# Patient Record
Sex: FEMALE | Race: OTHER | HISPANIC OR LATINO
[De-identification: names, ages, dates, MRNs, and addresses within clinical notes are randomized per-mention and may not be internally consistent; named-entity substitution may affect disease eponyms.]

---

## 2020-12-01 ENCOUNTER — APPOINTMENT (OUTPATIENT)
Dept: OTOLARYNGOLOGY | Facility: CLINIC | Age: 50
End: 2020-12-01
Payer: COMMERCIAL

## 2020-12-01 VITALS
BODY MASS INDEX: 27.32 KG/M2 | WEIGHT: 170 LBS | HEART RATE: 77 BPM | DIASTOLIC BLOOD PRESSURE: 89 MMHG | HEIGHT: 66 IN | SYSTOLIC BLOOD PRESSURE: 133 MMHG

## 2020-12-01 DIAGNOSIS — R53.83 OTHER FATIGUE: ICD-10-CM

## 2020-12-01 DIAGNOSIS — H92.09 OTALGIA, UNSPECIFIED EAR: ICD-10-CM

## 2020-12-01 DIAGNOSIS — Z82.2 FAMILY HISTORY OF DEAFNESS AND HEARING LOSS: ICD-10-CM

## 2020-12-01 DIAGNOSIS — R51.9 HEADACHE, UNSPECIFIED: ICD-10-CM

## 2020-12-01 DIAGNOSIS — Z83.52 FAMILY HISTORY OF EAR DISORDERS: ICD-10-CM

## 2020-12-01 DIAGNOSIS — R09.81 NASAL CONGESTION: ICD-10-CM

## 2020-12-01 PROBLEM — Z00.00 ENCOUNTER FOR PREVENTIVE HEALTH EXAMINATION: Status: ACTIVE | Noted: 2020-12-01

## 2020-12-01 PROCEDURE — 99072 ADDL SUPL MATRL&STAF TM PHE: CPT

## 2020-12-01 PROCEDURE — 92557 COMPREHENSIVE HEARING TEST: CPT

## 2020-12-01 PROCEDURE — 99205 OFFICE O/P NEW HI 60 MIN: CPT | Mod: 25

## 2020-12-01 PROCEDURE — 92567 TYMPANOMETRY: CPT

## 2020-12-01 RX ORDER — TRIAMCINOLONE ACETONIDE 1 MG/G
0.1 PASTE DENTAL
Qty: 5 | Refills: 0 | Status: DISCONTINUED | COMMUNITY
Start: 2020-11-16

## 2020-12-01 RX ORDER — MECLIZINE HYDROCHLORIDE 12.5 MG/1
12.5 TABLET ORAL
Qty: 30 | Refills: 0 | Status: DISCONTINUED | COMMUNITY
Start: 2020-08-22

## 2020-12-01 RX ORDER — MULTIVITAMIN WITH FOLIC ACID 400 MCG
TABLET ORAL
Qty: 30 | Refills: 0 | Status: ACTIVE | COMMUNITY
Start: 2020-10-23

## 2020-12-01 RX ORDER — HYDROXYZINE PAMOATE 25 MG/1
25 CAPSULE ORAL
Qty: 30 | Refills: 0 | Status: DISCONTINUED | COMMUNITY
Start: 2020-07-24

## 2020-12-01 RX ORDER — CYCLOBENZAPRINE HYDROCHLORIDE 10 MG/1
10 TABLET, FILM COATED ORAL
Qty: 30 | Refills: 0 | Status: ACTIVE | COMMUNITY
Start: 2020-07-24

## 2020-12-01 RX ORDER — ALENDRONATE SODIUM 70 MG/1
70 TABLET ORAL
Qty: 4 | Refills: 0 | Status: DISCONTINUED | COMMUNITY
Start: 2020-07-24

## 2020-12-01 RX ORDER — LORATADINE 10 MG/1
10 TABLET ORAL
Qty: 30 | Refills: 0 | Status: DISCONTINUED | COMMUNITY
Start: 2020-07-24

## 2020-12-01 RX ORDER — CHLORHEXIDINE GLUCONATE, 0.12% ORAL RINSE 1.2 MG/ML
0.12 SOLUTION DENTAL
Qty: 473 | Refills: 0 | Status: DISCONTINUED | COMMUNITY
Start: 2020-11-16

## 2020-12-01 RX ORDER — CALCIUM/D3/MAG OX/COP/MANG/ZN 600 MG-20
600-800 TABLET ORAL
Qty: 60 | Refills: 0 | Status: ACTIVE | COMMUNITY
Start: 2020-11-13

## 2020-12-01 NOTE — PHYSICAL EXAM
[de-identified] : No bruits! [Midline] : trachea located in midline position [] : Frank[place]-Hallpike test is negative [Normal] : no rashes

## 2020-12-01 NOTE — REASON FOR VISIT
[Initial Evaluation] : an initial evaluation for [Initial Consultation] : an initial consultation for [FreeTextEntry2] : initial visit for otalgia

## 2020-12-01 NOTE — PHYSICAL EXAM
[de-identified] : No bruits! [Midline] : trachea located in midline position [] : Beemer-Hallpike test is negative [Normal] : no rashes

## 2020-12-01 NOTE — CONSULT LETTER
[Dear  ___] : Dear  [unfilled], [Consult Letter:] : I had the pleasure of evaluating your patient, [unfilled]. [Please see my note below.] : Please see my note below. [Sincerely,] : Sincerely, [Consult Closing:] : Thank you very much for allowing me to participate in the care of this patient.  If you have any questions, please do not hesitate to contact me. [FreeTextEntry2] : Kali Strickland MD [FreeTextEntry3] : Rush Garrett MD, FACS\par Chief of Otolaryngology at [facility]\par \par Dept. of Otolaryngology\par [facility]\par

## 2020-12-01 NOTE — ASSESSMENT
[FreeTextEntry1] : Pt with no evidence of BPPV.  Her history is suspicious for vestibular migraine, or possibly Meniere's.  Pt to see on of the Otology team members for further management.

## 2020-12-01 NOTE — CONSULT LETTER
[Dear  ___] : Dear  [unfilled], [Consult Letter:] : I had the pleasure of evaluating your patient, [unfilled]. [Please see my note below.] : Please see my note below. [Sincerely,] : Sincerely, [Consult Closing:] : Thank you very much for allowing me to participate in the care of this patient.  If you have any questions, please do not hesitate to contact me. [FreeTextEntry2] : Kali Strickland MD [FreeTextEntry3] : Rush Garrett MD, FACS\par Chief of Otolaryngology at [facility]\par \par Dept. of Otolaryngology\par [facility]\par

## 2021-01-07 ENCOUNTER — APPOINTMENT (OUTPATIENT)
Dept: OTOLARYNGOLOGY | Facility: CLINIC | Age: 51
End: 2021-01-07
Payer: COMMERCIAL

## 2021-01-07 VITALS
WEIGHT: 170 LBS | DIASTOLIC BLOOD PRESSURE: 87 MMHG | HEIGHT: 66 IN | HEART RATE: 76 BPM | BODY MASS INDEX: 27.32 KG/M2 | SYSTOLIC BLOOD PRESSURE: 149 MMHG

## 2021-01-07 DIAGNOSIS — R11.10 VOMITING, UNSPECIFIED: ICD-10-CM

## 2021-01-07 DIAGNOSIS — H93.13 TINNITUS, BILATERAL: ICD-10-CM

## 2021-01-07 PROCEDURE — 99214 OFFICE O/P EST MOD 30 MIN: CPT

## 2021-01-07 PROCEDURE — 99072 ADDL SUPL MATRL&STAF TM PHE: CPT

## 2021-01-07 RX ORDER — ALPRAZOLAM 0.5 MG/1
0.5 TABLET, ORALLY DISINTEGRATING ORAL
Qty: 20 | Refills: 0 | Status: ACTIVE | COMMUNITY
Start: 2021-01-07 | End: 1900-01-01

## 2021-01-07 RX ORDER — ONDANSETRON 4 MG/1
4 TABLET, ORALLY DISINTEGRATING ORAL
Qty: 30 | Refills: 1 | Status: ACTIVE | COMMUNITY
Start: 2021-01-07 | End: 1900-01-01

## 2021-01-07 RX ORDER — SCOPOLAMINE 1.5 MG/1
1 PATCH, EXTENDED RELEASE TRANSDERMAL
Qty: 10 | Refills: 0 | Status: ACTIVE | COMMUNITY
Start: 2021-01-07 | End: 1900-01-01

## 2021-01-07 NOTE — HISTORY OF PRESENT ILLNESS
[de-identified] : 50 year old referred by Dr. Garrett fainting, vomiting.  States in the past 6 months has fainted 3-4 times, has tinnitus, nausea and vomiting.  States has a lot of fatigue.  Lasts about a day when she has episodes.  States has had episodes for the past 10 years, and becoming more frequent.  Used to occur when she was traveling by plane but now is occurring even without traveling.  \par \par Brief episodes of room spinning vertigo with vomiting.  Vertigo last 2-3 minutes, vomits, then feels fatigues for 2-3 days\par This happens about once a week, has been occurring for the last year, more with movement\par hears heartbeat in both ears with episodes/headache\par used to only happen when she traveled/fly \par + Headaches, Migraines >10 years

## 2021-01-07 NOTE — DATA REVIEWED
[de-identified] : I personally reviewed the patient's audiogram, which shows\par hearing wnl AU\par type Ad tymp AU

## 2021-01-07 NOTE — REASON FOR VISIT
[Subsequent Evaluation] : a subsequent evaluation for [FreeTextEntry2] : referred by Dr. Garrett for fainting, vomiting

## 2021-01-07 NOTE — PHYSICAL EXAM
[Normal] : no rashes [Hearing Loss Right Only] : normal [Hearing Loss Left Only] : normal [Nystagmus] : ~T no ~M nystagmus was seen [Fukuda Step Test] : Fukuda Step Test was Negative [Romberg's Sign] : Romberg's sign was absent [Fistula Sign] : Fistula Sign: Negative [Past-Pointing] : Past-Pointing: Negative [Brittany-Hallanake] : [place]ram-Hallpike: Negative

## 2021-04-06 ENCOUNTER — APPOINTMENT (OUTPATIENT)
Dept: OTOLARYNGOLOGY | Facility: CLINIC | Age: 51
End: 2021-04-06
Payer: COMMERCIAL

## 2021-04-06 DIAGNOSIS — G43.909 MIGRAINE, UNSPECIFIED, NOT INTRACTABLE, W/OUT STATUS MIGRAINOSUS: ICD-10-CM

## 2021-04-06 DIAGNOSIS — R42 DIZZINESS AND GIDDINESS: ICD-10-CM

## 2021-04-06 PROCEDURE — 99072 ADDL SUPL MATRL&STAF TM PHE: CPT

## 2021-04-06 PROCEDURE — 99213 OFFICE O/P EST LOW 20 MIN: CPT

## 2021-04-07 PROBLEM — G43.909 MIGRAINE HEADACHE: Status: ACTIVE | Noted: 2020-12-01

## 2021-04-07 PROBLEM — R42 VERTIGO: Status: ACTIVE | Noted: 2020-12-01

## 2021-04-07 NOTE — HISTORY OF PRESENT ILLNESS
Pt medically cleared per MD Robledo. ADT-22 placed.   [de-identified] : 49 y/o woman presents today for VM and vertigo.  Vertigo better, Migraines persist\par has been watching diet\par keeping a diary of food triggers\par had identified troublesome foods\par had only one episode of bad vertigo and vomiting\par walking much better\par took Xanax about three times\par taking zofran as needed\par still with Migraines about three times a week, not responsive to NSAID\par reports high stress levels\par

## 2021-04-07 NOTE — REASON FOR VISIT
[Subsequent Evaluation] : a subsequent evaluation for [FreeTextEntry2] : f/u vertigo and vestibular migraine

## 2021-04-22 ENCOUNTER — APPOINTMENT (OUTPATIENT)
Dept: NEUROLOGY | Facility: CLINIC | Age: 51
End: 2021-04-22
Payer: COMMERCIAL

## 2021-04-22 VITALS
WEIGHT: 160 LBS | DIASTOLIC BLOOD PRESSURE: 80 MMHG | BODY MASS INDEX: 25.71 KG/M2 | HEART RATE: 82 BPM | SYSTOLIC BLOOD PRESSURE: 128 MMHG | OXYGEN SATURATION: 98 % | TEMPERATURE: 97 F | HEIGHT: 66 IN

## 2021-04-22 DIAGNOSIS — G43.809 OTHER MIGRAINE, NOT INTRACTABLE, W/OUT STATUS MIGRAINOSUS: ICD-10-CM

## 2021-04-22 PROCEDURE — 99072 ADDL SUPL MATRL&STAF TM PHE: CPT

## 2021-04-22 PROCEDURE — 99205 OFFICE O/P NEW HI 60 MIN: CPT

## 2021-04-22 RX ORDER — NAPROXEN 500 MG/1
500 TABLET ORAL
Qty: 60 | Refills: 2 | Status: ACTIVE | COMMUNITY
Start: 2020-10-23 | End: 1900-01-01

## 2021-04-22 RX ORDER — OMEGA-3/DHA/EPA/FISH OIL 300-1000MG
400 CAPSULE ORAL TWICE DAILY
Qty: 60 | Refills: 2 | Status: ACTIVE | COMMUNITY
Start: 2021-04-22 | End: 1900-01-01

## 2021-04-22 RX ORDER — PROPRANOLOL HYDROCHLORIDE 60 MG/1
60 CAPSULE, EXTENDED RELEASE ORAL DAILY
Qty: 30 | Refills: 0 | Status: ACTIVE | COMMUNITY
Start: 2021-04-22 | End: 1900-01-01

## 2021-04-22 RX ORDER — VITS A,C,E/LUTEIN/MINERALS 300MCG-200
TABLET ORAL
Refills: 0 | Status: ACTIVE | COMMUNITY

## 2021-04-23 NOTE — REASON FOR VISIT
[Consultation] : a consultation visit [[employer] ] : provided by [employer]   [FreeTextEntry1] : Headache, Vertigo

## 2021-04-23 NOTE — ASSESSMENT
[FreeTextEntry1] : 49yo female with history of migraine associated with vertigo presented for recurrent headaches worsening over the past year. Has been evaluated by ENT as well with no other obvious otologic source for presentation. Suspect vestibular migraine for which recommend to start propranolol once a day, at night time with use of Naprosyn only in case of a break through headache. Recommend to continue magnesium 400mg twice a day.

## 2021-04-23 NOTE — REVIEW OF SYSTEMS
[Feeling Tired] : feeling tired [Neck Pain] : neck pain [Sleep Disturbances] : sleep disturbances [Anxiety] : anxiety [Negative] : Heme/Lymph [As Noted in HPI] : as noted in HPI [FreeTextEntry8] : history of "kidney pain"

## 2021-04-23 NOTE — END OF VISIT
[FreeTextEntry3] : I have seen and examined the patient, and I agree with the above history, physical examination, assessment, and plan. [Time Spent: ___ minutes] : I have spent [unfilled] minutes of time on the encounter. [>50% of the face to face encounter time was spent on counseling and/or coordination of care for ___] : Greater than 50% of the face to face encounter time was spent on counseling and/or coordination of care for [unfilled]

## 2021-04-23 NOTE — HISTORY OF PRESENT ILLNESS
[FreeTextEntry1] : 51 yo female with history of migraine, osteoporosis, referred to office for frequent headaches. Patient reports she was diagnosed with migraine at age 11 and has tried multiple medications under previous neurologists, however has not had a complete control of her episodes; she cannot recollect the medications she has used before except for Naprosyn. Reports frequent headaches since over a year, frontal with radiation to the back of head and neck, pulsating and severe, exacerbated by stress or physical exertion. Episodes have been preceded by sensation of warmth, sometimes with metallic taste in mouth or tinnitus. Mostly associated with dizziness, which has been described as room spinning sensation, nausea and occasional vomiting. Reports minimal relief with naproxen in combination with cyclobenzaprine. Also responds to Magnesium 400mg twice a day.  Last episode of headache associated with vertigo was 2 months prior, currently endorses 2 episodes of headache per week. \par Of note, patient has seen ENT specialist as outpatient, has had negative work up for otologic source. I am assisted in this examination by resident physician, Dr. Lore Hernandez.

## 2021-04-23 NOTE — PHYSICAL EXAM
[General Appearance - Alert] : alert [General Appearance - In No Acute Distress] : in no acute distress [General Appearance - Well Nourished] : well nourished [General Appearance - Well Developed] : well developed [Oriented To Time, Place, And Person] : oriented to person, place, and time [Impaired Insight] : insight and judgment were intact [Affect] : the affect was normal [Mood] : the mood was normal [Memory Recent] : recent memory was not impaired [Memory Remote] : remote memory was not impaired [Person] : oriented to person [Place] : oriented to place [Time] : oriented to time [Short Term Intact] : short term memory intact [Remote Intact] : remote memory intact [Registration Intact] : recent registration memory intact [Span Intact] : the attention span was normal [Concentration Intact] : normal concentrating ability [Visual Intact] : visual attention was ~T not ~L decreased [Naming Objects] : no difficulty naming common objects [Repeating Phrases] : no difficulty repeating a phrase [Fluency] : fluency intact [Comprehension] : comprehension intact [Cranial Nerves Optic (II)] : visual acuity intact bilaterally,  visual fields full to confrontation, pupils equal round and reactive to light [Cranial Nerves Oculomotor (III)] : extraocular motion intact [Cranial Nerves Trigeminal (V)] : facial sensation intact symmetrically [Cranial Nerves Facial (VII)] : face symmetrical [Cranial Nerves Vestibulocochlear (VIII)] : hearing was intact bilaterally [Cranial Nerves Glossopharyngeal (IX)] : tongue and palate midline [Cranial Nerves Accessory (XI - Cranial And Spinal)] : head turning and shoulder shrug symmetric [Cranial Nerves Hypoglossal (XII)] : there was no tongue deviation with protrusion [Motor Strength] : muscle strength was normal in all four extremities [No Muscle Atrophy] : normal bulk in all four extremities [Sensation Tactile Decrease] : light touch was intact [Balance] : balance was intact [2+] : Ankle jerk left 2+ [Sclera] : the sclera and conjunctiva were normal [PERRL With Normal Accommodation] : pupils were equal in size, round, reactive to light, with normal accommodation [Extraocular Movements] : extraocular movements were intact [Outer Ear] : the ears and nose were normal in appearance [Oropharynx] : the oropharynx was normal [Neck Appearance] : the appearance of the neck was normal [Auscultation Breath Sounds / Voice Sounds] : lungs were clear to auscultation bilaterally [Heart Rate And Rhythm] : heart rate was normal and rhythm regular [Heart Sounds] : normal S1 and S2 [Heart Sounds Gallop] : no gallops [Murmurs] : no murmurs [Heart Sounds Pericardial Friction Rub] : no pericardial rub [Full Pulse] : the pedal pulses are present [Edema] : there was no peripheral edema [No CVA Tenderness] : no ~M costovertebral angle tenderness [No Spinal Tenderness] : no spinal tenderness [Abnormal Walk] : normal gait [Nail Clubbing] : no clubbing  or cyanosis of the fingernails [Involuntary Movements] : no involuntary movements were seen [Musculoskeletal - Swelling] : no joint swelling seen [Motor Tone] : muscle strength and tone were normal [Skin Color & Pigmentation] : normal skin color and pigmentation [Skin Turgor] : normal skin turgor [] : no rash [Motor Handedness Right-Handed] : the patient is right hand dominant [Nystagmus] : ~T ~M nystagmus was seen [Paresis Pronator Drift Right-Sided] : no pronator drift on the right [Paresis Pronator Drift Left-Sided] : no pronator drift on the left [Motor Strength Upper Extremities Bilaterally] : strength was normal in both upper extremities [Motor Strength Lower Extremities Bilaterally] : strength was normal in both lower extremities [Romberg's Sign] : Romberg's sign was negtive [Past-pointing] : there was no past-pointing [Tremor] : no tremor present [Dysdiadochokinesia Bilaterally] : not present [Coordination - Dysmetria Impaired Finger-to-Nose Bilateral] : not present [Plantar Reflex Right Only] : normal on the right [Coordination - Dysmetria Impaired Heel-to-Shin Bilateral] : not present [Plantar Reflex Left Only] : normal on the left [___] : absent on the right [___] : absent on the left [FreeTextEntry8] : Normal, narrow-based gait. No difficulty with tiptoe, heel, and tandem gaits. [FreeTextEntry1] : Horizontal nystagmus from right to left

## 2021-06-21 ENCOUNTER — APPOINTMENT (OUTPATIENT)
Dept: NEUROLOGY | Facility: CLINIC | Age: 51
End: 2021-06-21

## 2021-07-14 ENCOUNTER — RX RENEWAL (OUTPATIENT)
Age: 51
End: 2021-07-14

## 2021-08-04 ENCOUNTER — APPOINTMENT (OUTPATIENT)
Dept: NEUROLOGY | Facility: CLINIC | Age: 51
End: 2021-08-04